# Patient Record
Sex: FEMALE | Race: WHITE | NOT HISPANIC OR LATINO | Employment: OTHER | URBAN - METROPOLITAN AREA
[De-identification: names, ages, dates, MRNs, and addresses within clinical notes are randomized per-mention and may not be internally consistent; named-entity substitution may affect disease eponyms.]

---

## 2017-11-01 ENCOUNTER — LAB VISIT (OUTPATIENT)
Dept: LAB | Facility: HOSPITAL | Age: 77
End: 2017-11-01
Attending: INTERNAL MEDICINE
Payer: MEDICARE

## 2017-11-01 DIAGNOSIS — D47.2 MONOCLONAL PARAPROTEINEMIA: ICD-10-CM

## 2017-11-01 LAB
ALBUMIN SERPL BCP-MCNC: 4.2 G/DL
ALP SERPL-CCNC: 71 U/L
ALT SERPL W/O P-5'-P-CCNC: 31 U/L
ANION GAP SERPL CALC-SCNC: 12 MMOL/L
AST SERPL-CCNC: 17 U/L
B2 MICROGLOB SERPL-MCNC: 4.6 UG/ML
BASOPHILS # BLD AUTO: 0.04 K/UL
BASOPHILS NFR BLD: 0.4 %
BILIRUB SERPL-MCNC: 0.3 MG/DL
BUN SERPL-MCNC: 30 MG/DL
CALCIUM SERPL-MCNC: 9.3 MG/DL
CHLORIDE SERPL-SCNC: 108 MMOL/L
CO2 SERPL-SCNC: 22 MMOL/L
CREAT SERPL-MCNC: 1.26 MG/DL
DIFFERENTIAL METHOD: ABNORMAL
EOSINOPHIL # BLD AUTO: 0.2 K/UL
EOSINOPHIL NFR BLD: 2.4 %
ERYTHROCYTE [DISTWIDTH] IN BLOOD BY AUTOMATED COUNT: 13.6 %
EST. GFR  (AFRICAN AMERICAN): 47 ML/MIN/1.73 M^2
EST. GFR  (NON AFRICAN AMERICAN): 41 ML/MIN/1.73 M^2
GLUCOSE SERPL-MCNC: 99 MG/DL
HCT VFR BLD AUTO: 34.2 %
HGB BLD-MCNC: 11.1 G/DL
IGA SERPL-MCNC: 195 MG/DL
IGG SERPL-MCNC: 1162 MG/DL
IGM SERPL-MCNC: 152 MG/DL
LYMPHOCYTES # BLD AUTO: 3.7 K/UL
LYMPHOCYTES NFR BLD: 40.6 %
MCH RBC QN AUTO: 30.9 PG
MCHC RBC AUTO-ENTMCNC: 32.5 G/DL
MCV RBC AUTO: 95 FL
MONOCYTES # BLD AUTO: 0.4 K/UL
MONOCYTES NFR BLD: 4.6 %
NEUTROPHILS # BLD AUTO: 4.8 K/UL
NEUTROPHILS NFR BLD: 52 %
NRBC BLD-RTO: 0 /100 WBC
PLATELET # BLD AUTO: 270 K/UL
PMV BLD AUTO: 10.2 FL
POTASSIUM SERPL-SCNC: 4.1 MMOL/L
PROT SERPL-MCNC: 7.4 G/DL
RBC # BLD AUTO: 3.59 M/UL
SODIUM SERPL-SCNC: 142 MMOL/L
WBC # BLD AUTO: 9.16 K/UL

## 2017-11-01 PROCEDURE — 80053 COMPREHEN METABOLIC PANEL: CPT | Mod: PN

## 2017-11-01 PROCEDURE — 86334 IMMUNOFIX E-PHORESIS SERUM: CPT | Mod: 26,,, | Performed by: PATHOLOGY

## 2017-11-01 PROCEDURE — 83520 IMMUNOASSAY QUANT NOS NONAB: CPT

## 2017-11-01 PROCEDURE — 36415 COLL VENOUS BLD VENIPUNCTURE: CPT | Mod: PN

## 2017-11-01 PROCEDURE — 80053 COMPREHEN METABOLIC PANEL: CPT

## 2017-11-01 PROCEDURE — 82232 ASSAY OF BETA-2 PROTEIN: CPT

## 2017-11-01 PROCEDURE — 82784 ASSAY IGA/IGD/IGG/IGM EACH: CPT | Mod: 59

## 2017-11-01 PROCEDURE — 84165 PROTEIN E-PHORESIS SERUM: CPT | Mod: 26,,, | Performed by: PATHOLOGY

## 2017-11-01 PROCEDURE — 85025 COMPLETE CBC W/AUTO DIFF WBC: CPT

## 2017-11-01 PROCEDURE — 84165 PROTEIN E-PHORESIS SERUM: CPT

## 2017-11-01 PROCEDURE — 85025 COMPLETE CBC W/AUTO DIFF WBC: CPT | Mod: PN

## 2017-11-01 PROCEDURE — 86334 IMMUNOFIX E-PHORESIS SERUM: CPT

## 2017-11-02 LAB
ALBUMIN SERPL ELPH-MCNC: 3.62 G/DL
ALPHA1 GLOB SERPL ELPH-MCNC: 0.33 G/DL
ALPHA2 GLOB SERPL ELPH-MCNC: 0.88 G/DL
B-GLOBULIN SERPL ELPH-MCNC: 0.8 G/DL
GAMMA GLOB SERPL ELPH-MCNC: 1.18 G/DL
KAPPA LC SER QL IA: 8.2 MG/DL
KAPPA LC/LAMBDA SER IA: 1.98
LAMBDA LC SER QL IA: 4.15 MG/DL
PATHOLOGIST INTERPRETATION SPE: NORMAL
PROT SERPL-MCNC: 6.8 G/DL

## 2017-11-03 LAB
INTERPRETATION SERPL IFE-IMP: NORMAL
PATHOLOGIST INTERPRETATION IFE: NORMAL

## 2017-11-08 ENCOUNTER — LAB VISIT (OUTPATIENT)
Dept: LAB | Facility: HOSPITAL | Age: 77
End: 2017-11-08
Attending: INTERNAL MEDICINE
Payer: MEDICARE

## 2017-11-08 DIAGNOSIS — D47.2 MONOCLONAL PARAPROTEINEMIA: Primary | ICD-10-CM

## 2017-11-08 LAB
PROT 24H UR-MRATE: 68 MG/SPEC
PROT UR-MCNC: 15 MG/DL
URINE COLLECTION DURATION: 24 HR
URINE VOLUME: 450 ML

## 2017-11-08 PROCEDURE — 84166 PROTEIN E-PHORESIS/URINE/CSF: CPT

## 2017-11-08 PROCEDURE — 86335 IMMUNFIX E-PHORSIS/URINE/CSF: CPT

## 2017-11-08 PROCEDURE — 84156 ASSAY OF PROTEIN URINE: CPT

## 2017-11-08 PROCEDURE — 86335 IMMUNFIX E-PHORSIS/URINE/CSF: CPT | Mod: 26,,, | Performed by: PATHOLOGY

## 2017-11-08 PROCEDURE — 84166 PROTEIN E-PHORESIS/URINE/CSF: CPT | Mod: 26,,, | Performed by: PATHOLOGY

## 2017-11-09 LAB
INTERPRETATION UR IFE-IMP: NORMAL
PATHOLOGIST INTERPRETATION UIFE: NORMAL
PATHOLOGIST INTERPRETATION UPE: NORMAL
PROT PATTERN UR ELPH-IMP: NORMAL

## 2018-03-16 PROBLEM — J30.1 SEASONAL ALLERGIC RHINITIS DUE TO POLLEN: Status: ACTIVE | Noted: 2018-03-16

## 2018-03-16 PROBLEM — N18.30 STAGE 3 CHRONIC KIDNEY DISEASE: Status: ACTIVE | Noted: 2018-03-16

## 2018-11-07 PROBLEM — N17.9 ARF (ACUTE RENAL FAILURE): Status: ACTIVE | Noted: 2018-11-07

## 2018-11-07 PROBLEM — R11.2 N&V (NAUSEA AND VOMITING): Status: ACTIVE | Noted: 2018-11-07

## 2018-11-13 PROBLEM — D64.9 NORMOCYTIC ANEMIA: Status: ACTIVE | Noted: 2018-11-13

## 2018-11-13 PROBLEM — N30.00 ACUTE CYSTITIS: Status: ACTIVE | Noted: 2018-11-13

## 2018-11-13 PROBLEM — N18.9 ACUTE KIDNEY INJURY SUPERIMPOSED ON CKD: Status: ACTIVE | Noted: 2018-11-13

## 2018-11-13 PROBLEM — N17.9 AKI (ACUTE KIDNEY INJURY): Status: ACTIVE | Noted: 2018-11-13

## 2018-11-13 PROBLEM — N17.9 ACUTE KIDNEY INJURY SUPERIMPOSED ON CKD: Status: ACTIVE | Noted: 2018-11-13

## 2018-11-15 PROBLEM — R63.8 INADEQUATE ORAL INTAKE: Status: ACTIVE | Noted: 2018-11-15

## 2018-11-16 PROBLEM — N17.9 AKI (ACUTE KIDNEY INJURY): Status: RESOLVED | Noted: 2018-11-13 | Resolved: 2018-11-16

## 2019-03-18 PROBLEM — R19.7 DIARRHEA: Status: ACTIVE | Noted: 2019-03-18

## 2019-03-18 PROBLEM — N18.4 BENIGN HYPERTENSION WITH CKD (CHRONIC KIDNEY DISEASE) STAGE IV: Status: ACTIVE | Noted: 2019-03-18

## 2019-03-18 PROBLEM — I12.9 BENIGN HYPERTENSION WITH CKD (CHRONIC KIDNEY DISEASE) STAGE IV: Status: ACTIVE | Noted: 2019-03-18

## 2019-04-15 ENCOUNTER — LAB VISIT (OUTPATIENT)
Dept: LAB | Facility: HOSPITAL | Age: 79
End: 2019-04-15
Attending: INTERNAL MEDICINE
Payer: MEDICARE

## 2019-04-15 DIAGNOSIS — N18.30 STAGE 3 CHRONIC KIDNEY DISEASE: ICD-10-CM

## 2019-04-15 DIAGNOSIS — N18.2 STAGE 2 CHRONIC KIDNEY DISEASE: ICD-10-CM

## 2019-04-15 DIAGNOSIS — D64.9 NORMOCYTIC ANEMIA: ICD-10-CM

## 2019-04-15 DIAGNOSIS — D47.2 MONOCLONAL PARAPROTEINEMIA: ICD-10-CM

## 2019-04-15 LAB
ABO + RH BLD: NORMAL
BLD GP AB SCN CELLS X3 SERPL QL: NORMAL

## 2019-04-15 PROCEDURE — 86850 RBC ANTIBODY SCREEN: CPT

## 2019-04-15 PROCEDURE — 86850 RBC ANTIBODY SCREEN: CPT | Mod: PN

## 2019-04-15 PROCEDURE — 36415 COLL VENOUS BLD VENIPUNCTURE: CPT | Mod: PN

## 2019-05-03 PROBLEM — D72.820 MONOCLONAL B-CELL LYMPHOCYTOSIS OF UNDETERMINED SIGNIFICANCE: Status: ACTIVE | Noted: 2019-05-03

## 2019-05-06 PROBLEM — I63.9 CEREBROVASCULAR ACCIDENT (CVA): Status: ACTIVE | Noted: 2019-05-06

## 2019-05-06 PROBLEM — R47.01 APHASIA: Status: ACTIVE | Noted: 2019-05-06

## 2019-07-24 ENCOUNTER — TELEPHONE (OUTPATIENT)
Dept: NEUROLOGY | Facility: CLINIC | Age: 79
End: 2019-07-24

## 2019-07-24 ENCOUNTER — OFFICE VISIT (OUTPATIENT)
Dept: NEUROLOGY | Facility: CLINIC | Age: 79
End: 2019-07-24
Payer: MEDICARE

## 2019-07-24 VITALS
HEART RATE: 92 BPM | WEIGHT: 133.19 LBS | BODY MASS INDEX: 20.18 KG/M2 | RESPIRATION RATE: 16 BRPM | DIASTOLIC BLOOD PRESSURE: 60 MMHG | HEIGHT: 68 IN | SYSTOLIC BLOOD PRESSURE: 91 MMHG

## 2019-07-24 DIAGNOSIS — I49.8 OTHER SPECIFIED CARDIAC ARRHYTHMIAS: ICD-10-CM

## 2019-07-24 DIAGNOSIS — Z86.73 HISTORY OF EMBOLIC STROKE: ICD-10-CM

## 2019-07-24 DIAGNOSIS — N18.30 STAGE 3 CHRONIC KIDNEY DISEASE: ICD-10-CM

## 2019-07-24 DIAGNOSIS — R47.01 APHASIA: ICD-10-CM

## 2019-07-24 DIAGNOSIS — Z86.73 HISTORY OF STROKE: ICD-10-CM

## 2019-07-24 DIAGNOSIS — I10 ESSENTIAL HYPERTENSION: ICD-10-CM

## 2019-07-24 DIAGNOSIS — I63.333 CEREBRAL INFARCTION DUE TO BILATERAL THROMBOSIS OF POSTERIOR CEREBRAL ARTERIES: Primary | ICD-10-CM

## 2019-07-24 DIAGNOSIS — D47.2 MONOCLONAL PARAPROTEINEMIA: ICD-10-CM

## 2019-07-24 DIAGNOSIS — D72.820 MONOCLONAL B-CELL LYMPHOCYTOSIS OF UNDETERMINED SIGNIFICANCE: ICD-10-CM

## 2019-07-24 DIAGNOSIS — E78.5 DYSLIPIDEMIA, GOAL LDL BELOW 70: ICD-10-CM

## 2019-07-24 PROCEDURE — 99214 OFFICE O/P EST MOD 30 MIN: CPT | Mod: PBBFAC,PN | Performed by: PSYCHIATRY & NEUROLOGY

## 2019-07-24 PROCEDURE — 99205 PR OFFICE/OUTPT VISIT, NEW, LEVL V, 60-74 MIN: ICD-10-PCS | Mod: S$PBB,,, | Performed by: PSYCHIATRY & NEUROLOGY

## 2019-07-24 PROCEDURE — 99205 OFFICE O/P NEW HI 60 MIN: CPT | Mod: S$PBB,,, | Performed by: PSYCHIATRY & NEUROLOGY

## 2019-07-24 PROCEDURE — 99999 PR PBB SHADOW E&M-EST. PATIENT-LVL IV: ICD-10-PCS | Mod: PBBFAC,,, | Performed by: PSYCHIATRY & NEUROLOGY

## 2019-07-24 PROCEDURE — 99999 PR PBB SHADOW E&M-EST. PATIENT-LVL IV: CPT | Mod: PBBFAC,,, | Performed by: PSYCHIATRY & NEUROLOGY

## 2019-07-24 RX ORDER — CYPROHEPTADINE HYDROCHLORIDE 2 MG/5ML
2 SOLUTION ORAL 2 TIMES DAILY
Refills: 12 | COMMUNITY
Start: 2019-07-12 | End: 2020-01-27 | Stop reason: SDUPTHER

## 2019-07-24 RX ORDER — CLOPIDOGREL BISULFATE 75 MG/1
75 TABLET ORAL DAILY
Qty: 90 TABLET | Refills: 3 | Status: SHIPPED | OUTPATIENT
Start: 2019-07-24 | End: 2019-07-24

## 2019-07-24 RX ORDER — CLOPIDOGREL BISULFATE 75 MG/1
75 TABLET ORAL DAILY
Qty: 90 TABLET | Refills: 3 | Status: SHIPPED | OUTPATIENT
Start: 2019-07-24 | End: 2020-01-01 | Stop reason: SDUPTHER

## 2019-07-24 NOTE — LETTER
July 30, 2019      Ernesto Baeza MD  [address]  [facility] [address]           [facility] Neurology  [address] [facility] [address] [place] [address]  Phone: [phone]  Fax: [phone]          Patient: Natali Chamberlain   MR Number: [id number]   YOB: 1940   Date of Visit: 7/24/2019       Dear Dr. Ernesto Baeza:    Thank you for referring Natali Chamberlain to me for evaluation. Attached you will find relevant portions of my assessment and plan of care.    If you have questions, please do not hesitate to call me. I look forward to following Natali Chamberlain along with you.    Sincerely,    Jas Begum, DO    Enclosure  CC:  No Recipients    If you would like to receive this communication electronically, please contact [email or web address] or [phone] to request more information on [employer] Link access.    For providers and/or their staff who would like to refer a patient to [facility], please contact us through our one-stop-shop provider referral line, [facility], at [phone].    If you feel you have received this communication in error or would no longer like to receive these types of communications, please e-mail [email or web address]

## 2019-07-24 NOTE — PROGRESS NOTES
Subjective:      7/24/2019       Patient ID: Natali Chamberlain is a right handed 79 y.o.  female who presents for evaluation of recent stroke    History of Present Illness 7/24/2019    Late April, developed word finding difficulty, trouble forming words. Daughter said could not get any words out.  Recalls knowing what she wanted to say. No hemiparesis. Resolved.     Hx of stroke in 2009; clinically presented with inability to talk, went to hospital (was in [place], [place]), worsened to involve BOTH sides of body symmetrically, could not walk or feed herself, went to rehab and recovered over several months, still would drag R leg, speech returned to normal. They do not have any idea what the cause of her stroke was.     Hx Dm - not sure if this was real? Not sure if HTN.  Former smoker.     Do not have any imaging studies; reviewed Radiology report which described severe atrophy, b/l regions of encephalomalacia, and two small acute lesions c/w stroke R and L occipital lobes.  Suggestive of embolic event from the posterior circulation.    Review of patient's allergies indicates:   Allergen Reactions    Penicillins      Other reaction(s): fainted     Current Outpatient Medications   Medication Sig Dispense Refill    atorvastatin (LIPITOR) 10 MG tablet TAKE ONE TABLET BY MOUTH ONCE DAILY 90 tablet 3    calcitRIOL (ROCALTROL) 0.25 MCG Cap Take 0.25 mcg by mouth. Take one tablet every Monday and Friday      calcium carbonate/vitamin D3 (VITAMIN D-3 ORAL) Take 25 mcg by mouth once daily.      clopidogrel (PLAVIX) 75 mg tablet Take 1 tablet (75 mg total) by mouth once daily. 90 tablet 3    cyanocobalamin 2000 MCG tablet Take 2,500 mcg by mouth once daily.      cyproheptadine (,PERIACTIN,) 2 mg/5 mL syrup Take 2 mg by mouth 2 (two) times daily.  12    diphenoxylate-atropine 2.5-0.025 mg (LOMOTIL) 2.5-0.025 mg per tablet Take 1 tablet by mouth 4 (four) times daily as needed for Diarrhea. 30 tablet 1    ferrous  sulfate, dried (SLOW FE) 160 mg (50 mg iron) TbSR Take 160 mg by mouth 2 (two) times daily.       L. rhamnosus GG/inulin (CULTURELLE PROBIOTICS ORAL) Take by mouth.      loratadine (CLARITIN) 10 mg tablet Take 10 mg by mouth once daily.      magnesium oxide 400 mg Cap Take 1 tablet by mouth once daily.      megestrol (MEGACE) 400 mg/10 mL (40 mg/mL) Susp Take by mouth. Take 10 ml by mouth daily      metoprolol succinate 25 mg CSpX Take 1 capsule by mouth once daily.      sertraline (ZOLOFT) 50 MG tablet TAKE ONE TABLET BY MOUTH ONCE DAILY 90 tablet 3    sodium bicarbonate 325 MG tablet Take 650 mg by mouth 3 (three) times daily.        No current facility-administered medications for this visit.        Past Medical History  Past Medical History:   Diagnosis Date    Abnormal blood chemistry     Allergic rhinitis     Ankle fracture 10/25/2016    right ankle    ARF (acute renal failure)     B12 deficiency     Depression     Dyslipidemia     Encounter for blood transfusion     Family history of stroke (cerebrovascular)     Fatigue     History of chicken pox     History of CVA (cerebrovascular accident)        Past Surgical History  Past Surgical History:   Procedure Laterality Date    Biopsy-bone marrow N/A 4/24/2019    Performed by [facility]win Sanchez [facility] at [facility] CATH    BIOPSY-BONE MARROW N/A 12/5/2017    Performed by Eladio Scherer [facility] at [facility] CATH    CYST REMOVAL      ovarian    PARTIAL HYSTERECTOMY         Family History  Family History   Problem Relation Age of Onset    Stroke Mother        Social History  Social History     Socioeconomic History    Marital status:      Spouse name: Not on file    Number of children: 2    Years of education: Not on file    Highest education level: Not on file   Occupational History    Not on file   Social Needs    Financial resource strain: Not on file    Food insecurity:     Worry: Not on file     Inability: Not on file    Transportation needs:      Medical: Not on file     Non-medical: Not on file   Tobacco Use    Smoking status: Former Smoker     Packs/day: 0.00     Last attempt to quit: 2018     Years since quittin.4    Smokeless tobacco: Never Used   Substance and Sexual Activity    Alcohol use: No    Drug use: No    Sexual activity: Not on file   Lifestyle    Physical activity:     Days per week: Not on file     Minutes per session: Not on file    Stress: Not on file   Relationships    Social connections:     Talks on phone: Not on file     Gets together: Not on file     Attends [employer] service: Not on file     Active member of club or organization: Not on file     Attends meetings of clubs or organizations: Not on file     Relationship status: Not on file   Other Topics Concern    Not on file   Social History Narrative    Not on file        Review of Systems  Review of Systems   Constitutional: Positive for fatigue and unexpected weight change (loss). Negative for chills and fever.   HENT: Negative for congestion.    Eyes: Negative for visual disturbance.   Respiratory: Positive for cough. Negative for shortness of breath and wheezing.    Cardiovascular: Negative for chest pain and palpitations.   Gastrointestinal: Negative for abdominal pain, constipation, diarrhea, nausea and vomiting.   Endocrine: Negative for cold intolerance and heat intolerance.   Genitourinary: Positive for difficulty urinating (incontinence). Negative for dysuria and hematuria.   Musculoskeletal: Positive for gait problem. Negative for arthralgias and myalgias.   Skin: Negative for rash and wound.   Allergic/Immunologic: Negative for immunocompromised state.   Neurological: Positive for speech difficulty and weakness. Negative for dizziness, tremors, seizures, numbness and headaches.   Hematological: Does not bruise/bleed easily.   Psychiatric/Behavioral: Negative for dysphoric mood and sleep disturbance. The patient is not nervous/anxious.        Objective:         Vitals:    07/24/19 1446   BP: 91/60   Pulse: 92   Resp: 16     Body mass index is 20.25 kg/m².  Neurologic Exam     Mental Status   Oriented to person.   Oriented to place.   Registration: recalls 2 of 3 objects (3/3 on second trial). Recall at 5 minutes: recalls 3 of 3 objects.   Attention: decreased. Concentration: normal.   Speech: speech is normal   Level of consciousness: alert  Able to name object. Able to repeat. Normal comprehension.   July, 21st, 1991  President Candis     Cranial Nerves   Cranial nerves II through XII intact.     Motor Exam   Muscle bulk: normal  Overall muscle tone: normal  Right arm pronator drift: absent  Left arm pronator drift: absent    Strength   Strength 5/5 throughout.     Sensory Exam   Light touch normal.   Pinprick normal.       Physical Exam   Neurological: She has normal strength.   Psychiatric: Her speech is normal.         Data Review:     Results for MARIANN CAMPBELL (MRN [id number]) as of 7/24/2019 15:47   Ref. Range 7/6/2019 08:41   WBC Latest Ref Range: 3.90 - 12.70 K/uL 9.12   RBC Latest Ref Range: 4.00 - 5.40 M/uL 2.61 (L)   Hemoglobin Latest Ref Range: 12.0 - 16.0 g/dL 8.4 (L)   Hematocrit Latest Ref Range: 37.0 - 48.5 % 25.7 (L)   MCV Latest Ref Range: 82 - 98 fL 99 (H)   MCH Latest Ref Range: 27.0 - 31.0 pg 32.2 (H)   MCHC Latest Ref Range: 32.0 - 36.0 g/dL 32.7   RDW Latest Ref Range: 11.5 - 14.5 % 13.5   Platelets Latest Ref Range: 150 - 350 K/uL 350   MPV Latest Ref Range: 9.2 - 12.9 fL 10.3   Gran% Latest Ref Range: 38.0 - 73.0 % 54.4   Gran # (ANC) Latest Ref Range: 1.8 - 7.7 K/uL 5.0   Lymph% Latest Ref Range: 18.0 - 48.0 % 30.6   Lymph # Latest Ref Range: 1.0 - 4.8 K/uL 2.8   Mono% Latest Ref Range: 4.0 - 15.0 % 11.2   Mono # Latest Ref Range: 0.3 - 1.0 K/uL 1.0   Eosinophil% Latest Ref Range: 0.0 - 8.0 % 0.1   Eos # Latest Ref Range: 0.0 - 0.5 K/uL 0.0   Basophil% Latest Ref Range: 0.0 - 1.9 % 0.5   Baso # Latest Ref Range: 0.00 - 0.20 K/uL 0.05   nRBC  Latest Ref Range: 0 /100 WBC 0   Differential Method Unknown Automated   Immature Grans (Abs) Latest Ref Range: 0.00 - 0.04 K/uL 0.29 (H)   Immature Granulocytes Latest Ref Range: 0.0 - 0.5 % 3.2 (H)   Iron Latest Ref Range: 30 - 160 ug/dL 58   TIBC Latest Ref Range: 265 - 497 ug/dL 234 (L)   Ferritin Latest Ref Range: 12 - 264 ng/mL 714 (H)   Iron Saturation Latest Ref Range: 20 - 50 % 25   Sodium Latest Ref Range: 136 - 145 mmol/L 136   Potassium Latest Ref Range: 3.5 - 5.1 mmol/L 3.9   Chloride Latest Ref Range: 95 - 110 mmol/L 98   CO2 Latest Ref Range: 22 - 31 mmol/L 23   Anion Gap Latest Ref Range: 8 - 16 mmol/L 15   BUN, Bld Latest Ref Range: 7 - 18 mg/dL 43 (H)   Creatinine Latest Ref Range: 0.50 - 1.40 mg/dL 3.82 (H)   eGFR if non African American Latest Ref Range: >60 mL/min/1.73 m^2 10 (A)   eGFR if African American Latest Ref Range: >60 mL/min/1.73 m^2 12 (A)   Glucose Latest Ref Range: 70 - 110 mg/dL 162 (H)   Calcium Latest Ref Range: 8.4 - 10.2 mg/dL 9.0   Phosphorus Latest Ref Range: 2.7 - 4.5 mg/dL 4.1   Magnesium Latest Ref Range: 1.6 - 2.6 mg/dL 2.1   Alkaline Phosphatase Latest Ref Range: 38 - 145 U/L 73   PROTEIN TOTAL Latest Ref Range: 6.0 - 8.4 g/dL 6.7   Albumin Latest Ref Range: 3.5 - 5.2 g/dL 3.3 (L)   Uric Acid Latest Ref Range: 2.4 - 5.7 mg/dL 6.2 (H)   BILIRUBIN TOTAL Latest Ref Range: 0.2 - 1.3 mg/dL 0.6   AST Latest Ref Range: 14 - 36 U/L 33   ALT Latest Ref Range: 10 - 44 U/L 29   CRP Latest Ref Range: 0.00 - 0.90 mg/dL 14.50 (H)   Triglycerides Latest Ref Range: 30 - 150 mg/dL 71   Cholesterol Latest Ref Range: 120 - 199 mg/dL 105 (L)   HDL Latest Ref Range: 40 - 75 mg/dL 31 (L)   Hdl/Cholesterol Ratio Latest Ref Range: 20.0 - 50.0 % 29.5   LDL Cholesterol External Latest Ref Range: 63.0 - 159.0 mg/dL 59.8 (L)   Non-HDL Cholesterol Latest Units: mg/dL 74   Total Cholesterol/HDL Ratio Latest Ref Range: 2.0 - 5.0  3.4   ALDOSTERONE Latest Units: ng/dL 4.5   Hemoglobin A1C External  Latest Ref Range: 0.0 - 5.6 % 5.5   Estimated Avg Glucose Latest Ref Range: 68 - 131 mg/dL 111   PTH Latest Ref Range: 9.0 - 77.0 pg/mL 126.0 (H)     Results for MARIANN CAMPBELL (MRN [id number]) as of 7/24/2019 15:47   Ref. Range 7/10/2019 07:30 7/10/2019 07:30   Specimen UA Unknown Urine, Unspecified    Color, UA Latest Ref Range: Yellow, Straw, Emily  Yellow    Appearance, UA Latest Ref Range: Clear  Turbid    Specific Gravity, UA Latest Ref Range: 1.005 - 1.030  <=1.005    pH, UA Latest Ref Range: 5.0 - 8.0  8.0    Protein, UA Latest Ref Range: Negative  2+ (A)    Glucose, UA Latest Ref Range: Negative  Negative    Ketones, UA Latest Ref Range: Negative  Negative    Occult Blood UA Latest Ref Range: Negative  2+ (A)    NITRITE UA Latest Ref Range: Negative  Positive (A)    UROBILINOGEN UA Latest Ref Range: <2.0 EU/dL 0.2    Bilirubin (UA) Latest Ref Range: Negative  Negative    Leukocytes, UA Latest Ref Range: Negative  3+ (A)    RBC, UA Latest Ref Range: 0 - 4 /hpf  0   WBC, UA Latest Ref Range: 0 - 5 /hpf >100 (H) >100 (H)   Bacteria, UA Latest Ref Range: None-Occ /hpf  SEE COMMENT   Squam Epithel, UA Latest Units: /hpf  SEE COMMENT   Hyaline Casts, UA Latest Ref Range: 0 - 1 /lpf  0   Microscopic Comment Unknown  SEE COMMENT     Carotid U/S 5/9/19:  Impression       There are atherosclerotic changes demonstrated bilaterally left slightly more so than right.  There is anterograde vertebral color Doppler flow demonstrated bilaterally.  No Doppler hemodynamically significant carotid stenosis is appreciated.    NASCET criteria were utilized.     Echo 3/19/19:  TRANSTHORACIC ECHO (TTE) COMPLETE   Conclusion     · Normal left ventricular systolic function. The estimated ejection fraction is 60%  · Grade I (mild) left ventricular diastolic dysfunction consistent with impaired relaxation.  · Normal left atrial pressure.  · Normal right ventricular systolic function.     72 hour Holter 2/19/19:  HOLTER MONITOR - 72 HOUR    Conclusion     · Unremarkable event monitor there is no evidence of clinically significant tachy or bradyarrhythmia detected  · Patient reported 3 events there is no correlation with arrhythmia       Assessment:       1. 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 History of embolic stroke    3. History of stroke    4. Monoclonal paraproteinemia    5. Monoclonal B-cell lymphocytosis of undetermined significance    6. Essential hypertension    7. Stage 3 chronic kidney disease    8. Dyslipidemia, goal LDL below 70    9. Other specified cardiac arrhythmias     10. 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  Discussed tx strategies in detail         Relevant Medications    clopidogrel (PLAVIX) 75 mg tablet    Other Relevant Orders    MRA Brain    MRA Neck without contrast    Cardiac event monitor       Cardiac/Vascular    Essential hypertension    Dyslipidemia, goal LDL below 70       Renal/    Stage 3 chronic kidney disease       Oncology    Monoclonal paraproteinemia    Monoclonal B-cell lymphocytosis of undetermined significance      Other Visit Diagnoses     Other specified cardiac arrhythmias         Relevant Orders    Cardiac event monitor          Follow up in about 6 weeks (around 9/4/2019).        Patient information shared at the time of visit:  To reduce the risk of stroke or heart disease, a healthy diet and regular exercise are extremely important  in addition to medical treatment.    A Mediterranean style diet, or the DASH diet with lots of fresh fruits and vegetables, whole grains, more fish and chicken, less red meat, less dairy, less processed foods is beneficial.      Recommend taking omega-3 fatty acids with sufficient amounts of EPA and DHA, 1000 to 2000 mg a day    Walk 30 minutes a day, 3-5 days a week - if joint pain is an issue, consider stationary bicycle, swimming, or other low impact exercises.  Relaxation and stress reduction through exercise, yoga, mindfulness meditation, or formal counseling is also beneficial overall towards health.    Control of high blood pressure, elevated cholesterol (specifically LDL target below 70), blood sugar and diabetes control, not smoking, avoiding excessive alcohol consumption (less than one alcoholic drink a day for women, two a day for men) and regular follow up with your doctor can significantly reduce the risk of future stroke.     Over 60 minutes time spent with patient face to face, > 50% in discussion and counseling with patient regarding diagnosis, prognosis and treatment strategies related to embolic stroke as described above    Thank you very much for the opportunity to assist in this patient's care.  If you have any questions or concerns, please do not hesitate to contact me at any time.     Sincerely,  Jas Begum, DO

## 2019-07-30 PROBLEM — I63.333: Status: ACTIVE | Noted: 2019-07-30

## 2019-07-30 NOTE — ASSESSMENT & PLAN NOTE
Suspect embolic event - plavix for now, refer for ILR and if demonstrates AF switch to oral AC, likely will need warfarin because of renal status.  Discussed tx strategies in detail

## 2019-07-31 ENCOUNTER — TELEPHONE (OUTPATIENT)
Dept: NEUROLOGY | Facility: CLINIC | Age: 79
End: 2019-07-31

## 2019-07-31 NOTE — TELEPHONE ENCOUNTER
----- Message from Theodora Rasmussen sent at 7/31/2019 10:12 AM CDT -----  Contact: Daughter  Type: Needs Medical Advice    Who Called: Salena La Call Back Number: [phone]  Additional Information: Need an order for speech therapy sent to [facility] today [phone]. If not the patinet will lose her other services the therapy must be done thru [facility] since they do offer speech the daughter would like a call back If there is any questions

## 2019-08-20 ENCOUNTER — HOSPITAL ENCOUNTER (OUTPATIENT)
Dept: RADIOLOGY | Facility: HOSPITAL | Age: 79
Discharge: HOME | End: 2019-08-20
Attending: PSYCHIATRY & NEUROLOGY
Payer: MEDICARE

## 2019-08-20 DIAGNOSIS — I63.333 CEREBRAL INFARCTION DUE TO BILATERAL THROMBOSIS OF POSTERIOR CEREBRAL ARTERIES: ICD-10-CM

## 2019-08-20 DIAGNOSIS — Z86.73 HISTORY OF STROKE: ICD-10-CM

## 2019-08-20 PROCEDURE — 70547 MR ANGIOGRAPHY NECK W/O DYE: CPT | Mod: 26,,, | Performed by: RADIOLOGY

## 2019-08-20 PROCEDURE — 70544 MR ANGIOGRAPHY HEAD W/O DYE: CPT | Mod: TC,PO

## 2019-08-20 PROCEDURE — 70544 MRA BRAIN WITHOUT CONTRAST: ICD-10-PCS | Mod: 26,,, | Performed by: RADIOLOGY

## 2019-08-20 PROCEDURE — 70547 MR ANGIOGRAPHY NECK W/O DYE: CPT | Mod: TC,PO

## 2019-08-20 PROCEDURE — 70544 MR ANGIOGRAPHY HEAD W/O DYE: CPT | Mod: 26,,, | Performed by: RADIOLOGY

## 2019-08-20 PROCEDURE — 70547 MRA NECK WITHOUT CONTRAST: ICD-10-PCS | Mod: 26,,, | Performed by: RADIOLOGY

## 2019-08-29 ENCOUNTER — OFFICE VISIT (OUTPATIENT)
Dept: NEUROLOGY | Facility: CLINIC | Age: 79
End: 2019-08-29
Payer: MEDICARE

## 2019-08-29 ENCOUNTER — TELEPHONE (OUTPATIENT)
Dept: HOME HEALTH SERVICES | Facility: HOSPITAL | Age: 79
End: 2019-08-29

## 2019-08-29 VITALS
DIASTOLIC BLOOD PRESSURE: 72 MMHG | RESPIRATION RATE: 18 BRPM | SYSTOLIC BLOOD PRESSURE: 122 MMHG | HEART RATE: 89 BPM | WEIGHT: 129.88 LBS | HEIGHT: 68 IN | BODY MASS INDEX: 19.68 KG/M2

## 2019-08-29 DIAGNOSIS — N18.4 STAGE 4 CHRONIC KIDNEY DISEASE: ICD-10-CM

## 2019-08-29 DIAGNOSIS — I10 ESSENTIAL HYPERTENSION: ICD-10-CM

## 2019-08-29 DIAGNOSIS — R47.01 APHASIA: ICD-10-CM

## 2019-08-29 DIAGNOSIS — Z86.73 CHRONIC ARTERIAL ISCHEMIC STROKE, MULTIFOCAL, MULTIPLE VASCULAR TERRITORIES: Primary | ICD-10-CM

## 2019-08-29 DIAGNOSIS — I65.22 LEFT CAROTID ARTERY STENOSIS: ICD-10-CM

## 2019-08-29 DIAGNOSIS — E78.5 DYSLIPIDEMIA, GOAL LDL BELOW 70: ICD-10-CM

## 2019-08-29 DIAGNOSIS — I67.2 ATHEROSCLEROTIC CEREBROVASCULAR DISEASE: ICD-10-CM

## 2019-08-29 PROCEDURE — 99214 OFFICE O/P EST MOD 30 MIN: CPT | Mod: S$PBB,,, | Performed by: PSYCHIATRY & NEUROLOGY

## 2019-08-29 PROCEDURE — 99999 PR PBB SHADOW E&M-EST. PATIENT-LVL III: CPT | Mod: PBBFAC,,, | Performed by: PSYCHIATRY & NEUROLOGY

## 2019-08-29 PROCEDURE — 99213 OFFICE O/P EST LOW 20 MIN: CPT | Mod: PBBFAC,PN | Performed by: PSYCHIATRY & NEUROLOGY

## 2019-08-29 PROCEDURE — 99214 PR OFFICE/OUTPT VISIT, EST, LEVL IV, 30-39 MIN: ICD-10-PCS | Mod: S$PBB,,, | Performed by: PSYCHIATRY & NEUROLOGY

## 2019-08-29 PROCEDURE — 99999 PR PBB SHADOW E&M-EST. PATIENT-LVL III: ICD-10-PCS | Mod: PBBFAC,,, | Performed by: PSYCHIATRY & NEUROLOGY

## 2019-08-29 NOTE — PROGRESS NOTES
Subjective:          Patient ID: Natali Chamberlain is a 79 y.o.  female who presents for follow up of recent stroke     History of Present Illness 7/24/2019     Late April, developed word finding difficulty, trouble forming words. Daughter said could not get any words out.  Recalls knowing what she wanted to say. No hemiparesis. Resolved.      Hx of stroke in 2009; clinically presented with inability to talk, went to hospital (was in [place], [place]), worsened to involve BOTH sides of body symmetrically, could not walk or feed herself, went to rehab and recovered over several months, still would drag R leg, speech returned to normal. They do not have any idea what the cause of her stroke was.      Hx Dm - not sure if this was real? Not sure if HTN.  Former smoker.      Do not have any imaging studies; reviewed Radiology report which described severe atrophy, b/l regions of encephalomalacia, and two small acute lesions c/w stroke R and L occipital lobes.  Suggestive of embolic event from the posterior circulation.    Interval history 8/31/2019:    Reviewed history with her family; onset of aphasia in late April, sudden onset  Review of patient's allergies indicates:   Allergen Reactions    Penicillins      Other reaction(s): fainted     Current Outpatient Medications   Medication Sig Dispense Refill    atorvastatin (LIPITOR) 10 MG tablet TAKE ONE TABLET BY MOUTH ONCE DAILY 90 tablet 3    calcitRIOL (ROCALTROL) 0.25 MCG Cap Take 0.25 mcg by mouth every Monday. Take one tablet every Monday      calcium carbonate/vitamin D3 (VITAMIN D-3 ORAL) Take 25 mcg by mouth once daily.      clopidogrel (PLAVIX) 75 mg tablet Take 1 tablet (75 mg total) by mouth once daily. 90 tablet 3    cyanocobalamin 2000 MCG tablet Take 2,500 mcg by mouth once daily.      cyproheptadine (,PERIACTIN,) 2 mg/5 mL syrup Take 2 mg by mouth 2 (two) times daily.  12    diphenoxylate-atropine 2.5-0.025 mg (LOMOTIL) 2.5-0.025 mg per tablet  Take 1 tablet by mouth 4 (four) times daily as needed for Diarrhea. 30 tablet 1    ferrous sulfate, dried (SLOW FE) 160 mg (50 mg iron) TbSR Take 160 mg by mouth 2 (two) times daily.       L. rhamnosus GG/inulin (CULTURELLE PROBIOTICS ORAL) Take by mouth.      loratadine (CLARITIN) 10 mg tablet Take 10 mg by mouth once daily.      magnesium oxide 400 mg Cap Take 1 tablet by mouth once daily.      metoprolol succinate 25 mg CSpX Take 1 capsule by mouth once daily.      sertraline (ZOLOFT) 50 MG tablet TAKE ONE TABLET BY MOUTH ONCE DAILY 90 tablet 3    sodium bicarbonate 325 MG tablet Take 650 mg by mouth 3 (three) times daily.        No current facility-administered medications for this visit.          Review of Systems  Review of Systems    Objective:        Vitals:    08/29/19 1547   BP: 122/72   Pulse: 89   Resp: 18     Body mass index is 19.74 kg/m².  Neurologic Exam     Mental Status   Awake, alert, conversant.  In my office, [place].  After a pause August, 26th, 1991. Summer.     Reg2/3, 3/3 second trial    WORLD-DLOW    Recall 5 minutes later 3/3     Cranial Nerves   Cranial nerves II through XII intact.     Motor Exam   Muscle bulk: normal  Overall muscle tone: normal  Right arm pronator drift: absent  Left arm pronator drift: absent    Strength   Strength 5/5 throughout.       Physical Exam   Neurological: She has normal strength.         Data Review:    MRI brain 5/2019: personally reviewed and interpreted images      Region mentioned in report - one very small (approx 2 mm) region of increased diffuseion, no corresponding dark signal on ADC.  Possible subacute region of infarction vs T2 shine through. In context of the profoundly severe diffuse chronic small vessel disease, likely an active component of the overall chronic process        MRA neck 8/20/19:  Impression       1. There is a moderate short segment stenosis at the origin of the left internal carotid artery with stenosis close to 70%.   There is however patient motion which slightly limits evaluation and could be further evaluated with CTA.  2. 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     MRA head 8/20/19:  Impression       1. There are extensive chronic changes in the brain with diffuse volume loss and extensive white matter change with scattered chronic infarctions in the cerebral hemispheres as well as in bilateral cerebellar hemispheres.  There is no hemorrhage.  There is no mass.  There is no hydrocephalus or midline shift.  2. Anterior circulation is grossly normal.  3. With regard to posterior circulation, the left vertebral artery is not clearly visualized.  Very minimal flow in the very distal left vertebral artery could represent retrograde flow from the dominant and patent right vertebral artery.  The left vertebral artery may be hypoplastic and likely terminates in a posterior inferior cerebellar artery.  4. There is fetal origin of the left posterior cerebral artery which arises from the left anterior circulation.  This vessel is widely patent.  5. The right posterior cerebral artery is normal.  Please see above discussion.       Carotid U/S 5/9/19:  Impression         There are atherosclerotic changes demonstrated bilaterally left slightly more so than right.  There is anterograde vertebral color Doppler flow demonstrated bilaterally.  No Doppler hemodynamically significant carotid stenosis is appreciated.    NASCET criteria were utilized.      Echo 3/19/19:  TRANSTHORACIC ECHO (TTE) COMPLETE   Conclusion      · Normal left ventricular systolic function. The estimated ejection fraction is 60%  · Grade I (mild) left ventricular diastolic dysfunction consistent with impaired relaxation.  · Normal left atrial pressure.  · Normal right ventricular systolic function.      72 hour Holter 2/19/19:  HOLTER MONITOR - 72 HOUR   Conclusion      · Unremarkable event  monitor there is no evidence of clinically significant tachy or bradyarrhythmia detected  · Patient reported 3 events there is no correlation with arrhythmia     Results for MARIANN CAMPBELL (MRN [id number]) as of 8/29/2019 15:44   Ref. Range 8/19/2019 09:21   WBC Latest Ref Range: 3.90 - 12.70 K/uL 8.34   RBC Latest Ref Range: 4.00 - 5.40 M/uL 3.30 (L)   Hemoglobin Latest Ref Range: 12.0 - 16.0 g/dL 10.8 (L)   Hematocrit Latest Ref Range: 37.0 - 48.5 % 33.6 (L)   MCV Latest Ref Range: 82 - 98 fL 102 (H)   MCH Latest Ref Range: 27.0 - 31.0 pg 32.7 (H)   MCHC Latest Ref Range: 32.0 - 36.0 g/dL 32.1   RDW Latest Ref Range: 11.5 - 14.5 % 13.8   Platelets Latest Ref Range: 150 - 350 K/uL 265   MPV Latest Ref Range: 9.2 - 12.9 fL 10.0   Gran% Latest Ref Range: 38.0 - 73.0 % 67.6   Gran # (ANC) Latest Ref Range: 1.8 - 7.7 K/uL 5.6   Lymph% Latest Ref Range: 18.0 - 48.0 % 22.4   Lymph # Latest Ref Range: 1.0 - 4.8 K/uL 1.9   Mono% Latest Ref Range: 4.0 - 15.0 % 6.4   Mono # Latest Ref Range: 0.3 - 1.0 K/uL 0.5   Eosinophil% Latest Ref Range: 0.0 - 8.0 % 3.0   Eos # Latest Ref Range: 0.0 - 0.5 K/uL 0.3   Basophil% Latest Ref Range: 0.0 - 1.9 % 0.4   Baso # Latest Ref Range: 0.00 - 0.20 K/uL 0.03   nRBC Latest Ref Range: 0 /100 WBC 0   Differential Method Unknown Automated   Immature Grans (Abs) Latest Ref Range: 0.00 - 0.04 K/uL 0.02   Immature Granulocytes Latest Ref Range: 0.0 - 0.5 % 0.2   Iron Latest Ref Range: 30 - 160 ug/dL 271 (H)   TIBC Latest Ref Range: 265 - 497 ug/dL 240 (L)   Ferritin Latest Ref Range: 12 - 264 ng/mL 596 (H)   Iron Saturation Latest Ref Range: 20 - 50 % 113 (H)   Sodium Latest Ref Range: 136 - 145 mmol/L 140   Potassium Latest Ref Range: 3.5 - 5.1 mmol/L 3.3 (L)   Chloride Latest Ref Range: 95 - 110 mmol/L 103   CO2 Latest Ref Range: 22 - 31 mmol/L 26   Anion Gap Latest Ref Range: 8 - 16 mmol/L 11   BUN, Bld Latest Ref Range: 7 - 18 mg/dL 36 (H)   Creatinine Latest Ref Range: 0.50 - 1.40 mg/dL 3.31  (H)   eGFR if non African American Latest Ref Range: >60 mL/min/1.73 m^2 12 (A)   eGFR if African American Latest Ref Range: >60 mL/min/1.73 m^2 14 (A)   Glucose Latest Ref Range: 70 - 110 mg/dL 142 (H)   Calcium Latest Ref Range: 8.4 - 10.2 mg/dL 8.8   Phosphorus Latest Ref Range: 2.7 - 4.5 mg/dL 4.9 (H)   Magnesium Latest Ref Range: 1.6 - 2.6 mg/dL 2.2   Alkaline Phosphatase Latest Ref Range: 38 - 145 U/L 68   PROTEIN TOTAL Latest Ref Range: 6.0 - 8.4 g/dL 6.9   Albumin Latest Ref Range: 3.5 - 5.2 g/dL 3.5   Uric Acid Latest Ref Range: 2.4 - 5.7 mg/dL 5.3   BILIRUBIN TOTAL Latest Ref Range: 0.2 - 1.3 mg/dL 0.4   AST Latest Ref Range: 14 - 36 U/L 17   ALT Latest Ref Range: 10 - 44 U/L 11   PTH Latest Ref Range: 9.0 - 77.0 pg/mL 197.0 (H)   Specimen UA Unknown Urine, Unspecified   Color, UA Latest Ref Range: Yellow, Straw, Emily  Yellow   Appearance, UA Latest Ref Range: Clear  Turbid   Specific Gravity, UA Latest Ref Range: 1.005 - 1.030  1.010   pH, UA Latest Ref Range: 5.0 - 8.0  8.0   Protein, UA Latest Ref Range: Negative  2+ (A)   Glucose, UA Latest Ref Range: Negative  Negative   Ketones, UA Latest Ref Range: Negative  Negative   Occult Blood UA Latest Ref Range: Negative  2+ (A)   NITRITE UA Latest Ref Range: Negative  Negative   UROBILINOGEN UA Latest Ref Range: <2.0 EU/dL 0.2   Bilirubin (UA) Latest Ref Range: Negative  Negative   Leukocytes, UA Latest Ref Range: Negative  3+ (A)   RBC, UA Latest Ref Range: 0 - 4 /hpf SEE COMMENT   WBC, UA Latest Ref Range: 0 - 5 /hpf >100 (H)   Bacteria, UA Latest Ref Range: None-Occ /hpf Moderate (A)   Squam Epithel, UA Latest Units: /hpf SEE COMMENT   Hyaline Casts, UA Latest Ref Range: 0 - 1 /lpf 0   Microscopic Comment Unknown    Prot/Creat Ratio, Ur Latest Ref Range: 10.0 - 107.0 mg/g 3442.0 (H)   Protein, Urine Random Latest Ref Range: 0 - 11 mg/dL 95 (H)   Creatinine, Random Ur Latest Ref Range: 15.0 - 325.0 mg/dL 27.6     Results for MARIANN CAMPBELL (MRN [id number])  as of 8/29/2019 15:44   Ref. Range 5/11/2019 08:29 7/6/2019 08:41   Hemoglobin A1C External Latest Ref Range: 0.0 - 5.6 % 5.9 (H) 5.5   Estimated Avg Glucose Latest Ref Range: 68 - 131 mg/dL 123 111     Assessment:        1. Chronic arterial ischemic stroke, multifocal, multiple vascular territories    2. Severe atherosclerotic cerebrovascular disease    3. Stage 4 chronic kidney disease    4. Essential hypertension    5. Dyslipidemia, goal LDL below 70    6. Aphasia    7. Left carotid artery stenosis           Plan:         Problem List Items Addressed This Visit        Neuro    Aphasia    Chronic arterial ischemic stroke, multifocal, multiple vascular territories - Primary    Current Assessment & Plan     The microscopic, possibly subacute lesions seen on the MRI in May would not in and of itself be expected to cause any significant clinical symptoms.  More likely this was incidental, and high potential to find similar nonspecific lesions at any given time as she has severe chronic diffuse small-vessel cerebrovascular disease, with considerable encephalomalacia and cerebral atrophy.    Clinically, she looks amazingly good despite the severity of radiographic disease.  Counseled on continued risk factor reduction, recognition of stroke s/sx and acute treatment strategies               Relevant Orders    US Carotid Bilateral    Severe atherosclerotic cerebrovascular disease    Overview     Diffuse chronic small vessel disease           Current Assessment & Plan     Medical management         Relevant Orders    US Carotid Bilateral       Cardiac/Vascular    Essential hypertension    Dyslipidemia, goal LDL below 70    Left carotid artery stenosis    Current Assessment & Plan     Disparity between MRA (some movement artifact) and carotid U/S.  CTA or MRA with contrast not an option due to renal disease. Would be higher risk surgical candidate.     con't medical management, follow up carotid U/S in November              Relevant Orders    US Carotid Bilateral       Renal/    Stage 4 chronic kidney disease          Follow up in about 6 months (around 2/29/2020).       Thank you very much for the opportunity to assist in this patient's care.  If you have any questions or concerns, please do not hesitate to contact me at any time.     Sincerely,  Jas Begum, DO

## 2019-08-29 NOTE — ASSESSMENT & PLAN NOTE
The microscopic, possibly subacute lesions seen on the MRI in May would not in and of itself be expected to cause any significant clinical symptoms.  More likely this was incidental, and high potential to find similar nonspecific lesions at any given time as she has severe chronic diffuse small-vessel cerebrovascular disease, with considerable encephalomalacia and cerebral atrophy.    Clinically, she looks amazingly good despite the severity of radiographic disease.  Counseled on continued risk factor reduction, recognition of stroke s/sx and acute treatment strategies

## 2019-08-31 PROBLEM — I65.22 LEFT CAROTID ARTERY STENOSIS: Status: ACTIVE | Noted: 2019-08-31

## 2019-08-31 NOTE — ASSESSMENT & PLAN NOTE
Disparity between MRA (some movement artifact) and carotid U/S.  CTA or MRA with contrast not an option due to renal disease. Would be higher risk surgical candidate.     con't medical management, follow up carotid U/S in November

## 2020-01-01 ENCOUNTER — PATIENT MESSAGE (OUTPATIENT)
Dept: OTHER | Facility: OTHER | Age: 80
End: 2020-01-01

## 2020-01-01 DIAGNOSIS — Z86.73 HISTORY OF EMBOLIC STROKE: ICD-10-CM

## 2020-01-01 DIAGNOSIS — I63.333 CEREBRAL INFARCTION DUE TO BILATERAL THROMBOSIS OF POSTERIOR CEREBRAL ARTERIES: ICD-10-CM

## 2020-01-01 RX ORDER — CLOPIDOGREL BISULFATE 75 MG/1
75 TABLET ORAL DAILY
Qty: 90 TABLET | Refills: 3 | Status: SHIPPED | OUTPATIENT
Start: 2020-01-01

## 2020-10-14 NOTE — TELEPHONE ENCOUNTER
----- Message from Jessica Harper sent at 10/14/2020  3:14 PM CDT -----  Contact: Salena Phyllis (Daughter)  Salena Tayloryan (Daughter) calling states needs a refill on the pt clopidogrel (PLAVIX) 75 mg tablet, pt will be out in 2 days so needing it called in ,please and then call her at ..[phone] (home)             .  [facility] [address] [place] [address] [address] [facility] [address]  [address] [facility] [address]  [facility] [address]  Phone: [phone] Fax: [phone]

## 2021-01-01 ENCOUNTER — HOSPITAL ENCOUNTER (INPATIENT)
Facility: HOSPITAL | Age: 81
LOS: 1 days | Discharge: HOSPICE | DRG: 054 | End: 2021-04-26
Attending: EMERGENCY MEDICINE | Admitting: EMERGENCY MEDICINE
Payer: MEDICARE

## 2021-01-01 VITALS
HEART RATE: 73 BPM | WEIGHT: 131.81 LBS | BODY MASS INDEX: 19.98 KG/M2 | SYSTOLIC BLOOD PRESSURE: 114 MMHG | DIASTOLIC BLOOD PRESSURE: 53 MMHG | HEIGHT: 68 IN | RESPIRATION RATE: 18 BRPM | TEMPERATURE: 98 F | OXYGEN SATURATION: 93 %

## 2021-01-01 DIAGNOSIS — R91.8 LUNG MASS: Primary | ICD-10-CM

## 2021-01-01 DIAGNOSIS — D49.6 NEOPLASM OF BRAIN CAUSING MASS EFFECT ON ADJACENT STRUCTURES: ICD-10-CM

## 2021-01-01 DIAGNOSIS — R29.898 RIGHT HAND WEAKNESS: ICD-10-CM

## 2021-01-01 LAB
ALBUMIN SERPL BCP-MCNC: 2.9 G/DL (ref 3.5–5.2)
ALBUMIN SERPL BCP-MCNC: 3 G/DL (ref 3.5–5.2)
ALP SERPL-CCNC: 57 U/L (ref 55–135)
ALP SERPL-CCNC: 64 U/L (ref 55–135)
ALT SERPL W/O P-5'-P-CCNC: 8 U/L (ref 10–44)
ALT SERPL W/O P-5'-P-CCNC: 9 U/L (ref 10–44)
ANION GAP SERPL CALC-SCNC: 10 MMOL/L (ref 8–16)
ANION GAP SERPL CALC-SCNC: 9 MMOL/L (ref 8–16)
AST SERPL-CCNC: 12 U/L (ref 10–40)
AST SERPL-CCNC: 13 U/L (ref 10–40)
BASOPHILS # BLD AUTO: 0.01 K/UL (ref 0–0.2)
BASOPHILS # BLD AUTO: 0.01 K/UL (ref 0–0.2)
BASOPHILS NFR BLD: 0.1 % (ref 0–1.9)
BASOPHILS NFR BLD: 0.1 % (ref 0–1.9)
BILIRUB DIRECT SERPL-MCNC: 0.2 MG/DL (ref 0.1–0.3)
BILIRUB SERPL-MCNC: 0.3 MG/DL (ref 0.1–1)
BILIRUB SERPL-MCNC: 0.4 MG/DL (ref 0.1–1)
BUN SERPL-MCNC: 47 MG/DL (ref 8–23)
BUN SERPL-MCNC: 58 MG/DL (ref 8–23)
CALCIUM SERPL-MCNC: 8.5 MG/DL (ref 8.7–10.5)
CALCIUM SERPL-MCNC: 8.8 MG/DL (ref 8.7–10.5)
CHLORIDE SERPL-SCNC: 111 MMOL/L (ref 95–110)
CHLORIDE SERPL-SCNC: 111 MMOL/L (ref 95–110)
CO2 SERPL-SCNC: 17 MMOL/L (ref 23–29)
CO2 SERPL-SCNC: 18 MMOL/L (ref 23–29)
CREAT SERPL-MCNC: 3.4 MG/DL (ref 0.5–1.4)
CREAT SERPL-MCNC: 3.6 MG/DL (ref 0.5–1.4)
DIFFERENTIAL METHOD: ABNORMAL
DIFFERENTIAL METHOD: ABNORMAL
EOSINOPHIL # BLD AUTO: 0 K/UL (ref 0–0.5)
EOSINOPHIL # BLD AUTO: 0 K/UL (ref 0–0.5)
EOSINOPHIL NFR BLD: 0 % (ref 0–8)
EOSINOPHIL NFR BLD: 0.1 % (ref 0–8)
ERYTHROCYTE [DISTWIDTH] IN BLOOD BY AUTOMATED COUNT: 12.5 % (ref 11.5–14.5)
ERYTHROCYTE [DISTWIDTH] IN BLOOD BY AUTOMATED COUNT: 12.7 % (ref 11.5–14.5)
EST. GFR  (AFRICAN AMERICAN): 12.8 ML/MIN/1.73 M^2
EST. GFR  (AFRICAN AMERICAN): 13.7 ML/MIN/1.73 M^2
EST. GFR  (NON AFRICAN AMERICAN): 11.1 ML/MIN/1.73 M^2
EST. GFR  (NON AFRICAN AMERICAN): 11.9 ML/MIN/1.73 M^2
FERRITIN SERPL-MCNC: 921 NG/ML (ref 20–300)
GLUCOSE SERPL-MCNC: 119 MG/DL (ref 70–110)
GLUCOSE SERPL-MCNC: 134 MG/DL (ref 70–110)
HCT VFR BLD AUTO: 31 % (ref 37–48.5)
HCT VFR BLD AUTO: 31.2 % (ref 37–48.5)
HGB BLD-MCNC: 10.1 G/DL (ref 12–16)
HGB BLD-MCNC: 10.2 G/DL (ref 12–16)
IMM GRANULOCYTES # BLD AUTO: 0.03 K/UL (ref 0–0.04)
IMM GRANULOCYTES # BLD AUTO: 0.07 K/UL (ref 0–0.04)
IMM GRANULOCYTES NFR BLD AUTO: 0.4 % (ref 0–0.5)
IMM GRANULOCYTES NFR BLD AUTO: 0.6 % (ref 0–0.5)
LYMPHOCYTES # BLD AUTO: 0.9 K/UL (ref 1–4.8)
LYMPHOCYTES # BLD AUTO: 1.4 K/UL (ref 1–4.8)
LYMPHOCYTES NFR BLD: 12.1 % (ref 18–48)
LYMPHOCYTES NFR BLD: 12.2 % (ref 18–48)
MAGNESIUM SERPL-MCNC: 1.8 MG/DL (ref 1.6–2.6)
MAGNESIUM SERPL-MCNC: 2.1 MG/DL (ref 1.6–2.6)
MCH RBC QN AUTO: 32.2 PG (ref 27–31)
MCH RBC QN AUTO: 32.8 PG (ref 27–31)
MCHC RBC AUTO-ENTMCNC: 32.6 G/DL (ref 32–36)
MCHC RBC AUTO-ENTMCNC: 32.7 G/DL (ref 32–36)
MCV RBC AUTO: 101 FL (ref 82–98)
MCV RBC AUTO: 98 FL (ref 82–98)
MONOCYTES # BLD AUTO: 0.1 K/UL (ref 0.3–1)
MONOCYTES # BLD AUTO: 0.3 K/UL (ref 0.3–1)
MONOCYTES NFR BLD: 1 % (ref 4–15)
MONOCYTES NFR BLD: 2.2 % (ref 4–15)
NEUTROPHILS # BLD AUTO: 6.7 K/UL (ref 1.8–7.7)
NEUTROPHILS # BLD AUTO: 9.5 K/UL (ref 1.8–7.7)
NEUTROPHILS NFR BLD: 84.9 % (ref 38–73)
NEUTROPHILS NFR BLD: 86.3 % (ref 38–73)
NRBC BLD-RTO: 0 /100 WBC
NRBC BLD-RTO: 0 /100 WBC
PHOSPHATE SERPL-MCNC: 3.9 MG/DL (ref 2.7–4.5)
PHOSPHATE SERPL-MCNC: 4.8 MG/DL (ref 2.7–4.5)
PLATELET # BLD AUTO: 215 K/UL (ref 150–450)
PLATELET # BLD AUTO: 223 K/UL (ref 150–450)
PMV BLD AUTO: 10 FL (ref 9.2–12.9)
PMV BLD AUTO: 10.3 FL (ref 9.2–12.9)
POTASSIUM SERPL-SCNC: 5 MMOL/L (ref 3.5–5.1)
POTASSIUM SERPL-SCNC: 5.1 MMOL/L (ref 3.5–5.1)
PROT SERPL-MCNC: 6.7 G/DL (ref 6–8.4)
PROT SERPL-MCNC: 6.7 G/DL (ref 6–8.4)
RBC # BLD AUTO: 3.08 M/UL (ref 4–5.4)
RBC # BLD AUTO: 3.17 M/UL (ref 4–5.4)
RETICS/RBC NFR AUTO: 1.4 % (ref 0.5–2.5)
SODIUM SERPL-SCNC: 138 MMOL/L (ref 136–145)
SODIUM SERPL-SCNC: 138 MMOL/L (ref 136–145)
WBC # BLD AUTO: 11.24 K/UL (ref 3.9–12.7)
WBC # BLD AUTO: 7.75 K/UL (ref 3.9–12.7)

## 2021-01-01 PROCEDURE — 99497 PR ADVNCD CARE PLAN 30 MIN: ICD-10-PCS | Mod: ,,, | Performed by: STUDENT IN AN ORGANIZED HEALTH CARE EDUCATION/TRAINING PROGRAM

## 2021-01-01 PROCEDURE — 97116 GAIT TRAINING THERAPY: CPT

## 2021-01-01 PROCEDURE — 84100 ASSAY OF PHOSPHORUS: CPT | Performed by: STUDENT IN AN ORGANIZED HEALTH CARE EDUCATION/TRAINING PROGRAM

## 2021-01-01 PROCEDURE — 36415 COLL VENOUS BLD VENIPUNCTURE: CPT | Performed by: STUDENT IN AN ORGANIZED HEALTH CARE EDUCATION/TRAINING PROGRAM

## 2021-01-01 PROCEDURE — 96374 THER/PROPH/DIAG INJ IV PUSH: CPT

## 2021-01-01 PROCEDURE — 97161 PT EVAL LOW COMPLEX 20 MIN: CPT

## 2021-01-01 PROCEDURE — 83735 ASSAY OF MAGNESIUM: CPT | Performed by: STUDENT IN AN ORGANIZED HEALTH CARE EDUCATION/TRAINING PROGRAM

## 2021-01-01 PROCEDURE — 25000003 PHARM REV CODE 250: Performed by: STUDENT IN AN ORGANIZED HEALTH CARE EDUCATION/TRAINING PROGRAM

## 2021-01-01 PROCEDURE — 63600175 PHARM REV CODE 636 W HCPCS: Performed by: STUDENT IN AN ORGANIZED HEALTH CARE EDUCATION/TRAINING PROGRAM

## 2021-01-01 PROCEDURE — 11000001 HC ACUTE MED/SURG PRIVATE ROOM

## 2021-01-01 PROCEDURE — 99223 1ST HOSP IP/OBS HIGH 75: CPT | Mod: ,,, | Performed by: NEUROLOGICAL SURGERY

## 2021-01-01 PROCEDURE — 99285 PR EMERGENCY DEPT VISIT,LEVEL V: ICD-10-PCS | Mod: ,,, | Performed by: EMERGENCY MEDICINE

## 2021-01-01 PROCEDURE — 85025 COMPLETE CBC W/AUTO DIFF WBC: CPT | Performed by: STUDENT IN AN ORGANIZED HEALTH CARE EDUCATION/TRAINING PROGRAM

## 2021-01-01 PROCEDURE — 97530 THERAPEUTIC ACTIVITIES: CPT

## 2021-01-01 PROCEDURE — 99239 PR HOSPITAL DISCHARGE DAY,>30 MIN: ICD-10-PCS | Mod: GC,,, | Performed by: STUDENT IN AN ORGANIZED HEALTH CARE EDUCATION/TRAINING PROGRAM

## 2021-01-01 PROCEDURE — 99223 PR INITIAL HOSPITAL CARE,LEVL III: ICD-10-PCS | Mod: ,,, | Performed by: NEUROLOGICAL SURGERY

## 2021-01-01 PROCEDURE — 82248 BILIRUBIN DIRECT: CPT | Performed by: STUDENT IN AN ORGANIZED HEALTH CARE EDUCATION/TRAINING PROGRAM

## 2021-01-01 PROCEDURE — 85045 AUTOMATED RETICULOCYTE COUNT: CPT | Performed by: STUDENT IN AN ORGANIZED HEALTH CARE EDUCATION/TRAINING PROGRAM

## 2021-01-01 PROCEDURE — 99497 ADVNCD CARE PLAN 30 MIN: CPT | Mod: ,,, | Performed by: STUDENT IN AN ORGANIZED HEALTH CARE EDUCATION/TRAINING PROGRAM

## 2021-01-01 PROCEDURE — 82728 ASSAY OF FERRITIN: CPT | Performed by: STUDENT IN AN ORGANIZED HEALTH CARE EDUCATION/TRAINING PROGRAM

## 2021-01-01 PROCEDURE — 80053 COMPREHEN METABOLIC PANEL: CPT | Performed by: STUDENT IN AN ORGANIZED HEALTH CARE EDUCATION/TRAINING PROGRAM

## 2021-01-01 PROCEDURE — 25000003 PHARM REV CODE 250: Performed by: EMERGENCY MEDICINE

## 2021-01-01 PROCEDURE — 99223 1ST HOSP IP/OBS HIGH 75: CPT | Mod: AI,GC,, | Performed by: STUDENT IN AN ORGANIZED HEALTH CARE EDUCATION/TRAINING PROGRAM

## 2021-01-01 PROCEDURE — 99239 HOSP IP/OBS DSCHRG MGMT >30: CPT | Mod: GC,,, | Performed by: STUDENT IN AN ORGANIZED HEALTH CARE EDUCATION/TRAINING PROGRAM

## 2021-01-01 PROCEDURE — 99285 EMERGENCY DEPT VISIT HI MDM: CPT | Mod: ,,, | Performed by: EMERGENCY MEDICINE

## 2021-01-01 PROCEDURE — 63600175 PHARM REV CODE 636 W HCPCS: Performed by: EMERGENCY MEDICINE

## 2021-01-01 PROCEDURE — 99285 EMERGENCY DEPT VISIT HI MDM: CPT | Mod: 25

## 2021-01-01 PROCEDURE — 99223 PR INITIAL HOSPITAL CARE,LEVL III: ICD-10-PCS | Mod: AI,GC,, | Performed by: STUDENT IN AN ORGANIZED HEALTH CARE EDUCATION/TRAINING PROGRAM

## 2021-01-01 RX ORDER — FERROUS SULFATE 325(65) MG
325 TABLET, DELAYED RELEASE (ENTERIC COATED) ORAL DAILY
Status: CANCELLED | OUTPATIENT
Start: 2021-01-01

## 2021-01-01 RX ORDER — CALCITRIOL 0.25 UG/1
0.25 CAPSULE ORAL DAILY
Status: CANCELLED | OUTPATIENT
Start: 2021-01-01

## 2021-01-01 RX ORDER — LEVETIRACETAM 5 MG/ML
500 INJECTION INTRAVASCULAR EVERY 12 HOURS
Status: DISCONTINUED | OUTPATIENT
Start: 2021-01-01 | End: 2021-01-01 | Stop reason: HOSPADM

## 2021-01-01 RX ORDER — CHOLECALCIFEROL (VITAMIN D3) 50 MCG
1 TABLET ORAL DAILY
Status: CANCELLED | OUTPATIENT
Start: 2021-01-01

## 2021-01-01 RX ORDER — METOPROLOL SUCCINATE 25 MG/1
25 TABLET, EXTENDED RELEASE ORAL DAILY
Status: CANCELLED | OUTPATIENT
Start: 2021-01-01

## 2021-01-01 RX ORDER — PANTOPRAZOLE SODIUM 40 MG/1
40 TABLET, DELAYED RELEASE ORAL DAILY
Status: DISCONTINUED | OUTPATIENT
Start: 2021-01-01 | End: 2021-01-01 | Stop reason: HOSPADM

## 2021-01-01 RX ORDER — IBUPROFEN 200 MG
24 TABLET ORAL
Status: DISCONTINUED | OUTPATIENT
Start: 2021-01-01 | End: 2021-01-01 | Stop reason: HOSPADM

## 2021-01-01 RX ORDER — LEVETIRACETAM 500 MG/1
500 TABLET ORAL 2 TIMES DAILY
Qty: 60 TABLET | Refills: 11 | Status: SHIPPED | OUTPATIENT
Start: 2021-01-01 | End: 2022-04-25

## 2021-01-01 RX ORDER — CALCITRIOL 0.25 UG/1
0.25 CAPSULE ORAL DAILY
Status: DISCONTINUED | OUTPATIENT
Start: 2021-01-01 | End: 2021-01-01 | Stop reason: HOSPADM

## 2021-01-01 RX ORDER — SODIUM CHLORIDE 0.9 % (FLUSH) 0.9 %
10 SYRINGE (ML) INJECTION
Status: DISCONTINUED | OUTPATIENT
Start: 2021-01-01 | End: 2021-01-01 | Stop reason: HOSPADM

## 2021-01-01 RX ORDER — ATORVASTATIN CALCIUM 10 MG/1
10 TABLET, FILM COATED ORAL DAILY
Status: DISCONTINUED | OUTPATIENT
Start: 2021-01-01 | End: 2021-01-01 | Stop reason: HOSPADM

## 2021-01-01 RX ORDER — METOPROLOL SUCCINATE 25 MG/1
25 TABLET, EXTENDED RELEASE ORAL DAILY
Status: DISCONTINUED | OUTPATIENT
Start: 2021-01-01 | End: 2021-01-01 | Stop reason: HOSPADM

## 2021-01-01 RX ORDER — DEXAMETHASONE 4 MG/1
4 TABLET ORAL EVERY 6 HOURS
Qty: 40 TABLET | Refills: 0 | Status: SHIPPED | OUTPATIENT
Start: 2021-01-01 | End: 2021-01-01

## 2021-01-01 RX ORDER — CLOPIDOGREL BISULFATE 75 MG/1
75 TABLET ORAL DAILY
Status: CANCELLED | OUTPATIENT
Start: 2021-01-01

## 2021-01-01 RX ORDER — NAPROXEN SODIUM 220 MG/1
81 TABLET, FILM COATED ORAL DAILY
Status: DISCONTINUED | OUTPATIENT
Start: 2021-01-01 | End: 2021-01-01 | Stop reason: HOSPADM

## 2021-01-01 RX ORDER — HEPARIN SODIUM 5000 [USP'U]/ML
5000 INJECTION, SOLUTION INTRAVENOUS; SUBCUTANEOUS EVERY 8 HOURS
Status: DISCONTINUED | OUTPATIENT
Start: 2021-01-01 | End: 2021-01-01 | Stop reason: HOSPADM

## 2021-01-01 RX ORDER — DEXAMETHASONE SODIUM PHOSPHATE 4 MG/ML
4 INJECTION, SOLUTION INTRA-ARTICULAR; INTRALESIONAL; INTRAMUSCULAR; INTRAVENOUS; SOFT TISSUE EVERY 6 HOURS
Status: DISCONTINUED | OUTPATIENT
Start: 2021-01-01 | End: 2021-01-01 | Stop reason: HOSPADM

## 2021-01-01 RX ORDER — FERROUS SULFATE 325(65) MG
325 TABLET, DELAYED RELEASE (ENTERIC COATED) ORAL DAILY
Status: DISCONTINUED | OUTPATIENT
Start: 2021-01-01 | End: 2021-01-01 | Stop reason: HOSPADM

## 2021-01-01 RX ORDER — SODIUM CHLORIDE 9 MG/ML
INJECTION, SOLUTION INTRAVENOUS
Status: COMPLETED | OUTPATIENT
Start: 2021-01-01 | End: 2021-01-01

## 2021-01-01 RX ORDER — SODIUM BICARBONATE 325 MG/1
650 TABLET ORAL 3 TIMES DAILY
Status: DISCONTINUED | OUTPATIENT
Start: 2021-01-01 | End: 2021-01-01

## 2021-01-01 RX ORDER — ATORVASTATIN CALCIUM 10 MG/1
10 TABLET, FILM COATED ORAL DAILY
Status: CANCELLED | OUTPATIENT
Start: 2021-01-01

## 2021-01-01 RX ORDER — SODIUM BICARBONATE 650 MG/1
650 TABLET ORAL 3 TIMES DAILY
Status: CANCELLED | OUTPATIENT
Start: 2021-01-01

## 2021-01-01 RX ORDER — CETIRIZINE HYDROCHLORIDE 5 MG/1
10 TABLET ORAL DAILY
Status: CANCELLED | OUTPATIENT
Start: 2021-01-01

## 2021-01-01 RX ORDER — SERTRALINE HYDROCHLORIDE 50 MG/1
50 TABLET, FILM COATED ORAL DAILY
Status: DISCONTINUED | OUTPATIENT
Start: 2021-01-01 | End: 2021-01-01 | Stop reason: HOSPADM

## 2021-01-01 RX ORDER — SERTRALINE HYDROCHLORIDE 50 MG/1
50 TABLET, FILM COATED ORAL DAILY
Status: CANCELLED | OUTPATIENT
Start: 2021-01-01

## 2021-01-01 RX ORDER — ONDANSETRON 4 MG/1
8 TABLET, ORALLY DISINTEGRATING ORAL EVERY 8 HOURS PRN
Status: DISCONTINUED | OUTPATIENT
Start: 2021-01-01 | End: 2021-01-01 | Stop reason: HOSPADM

## 2021-01-01 RX ORDER — GLUCAGON 1 MG
1 KIT INJECTION
Status: DISCONTINUED | OUTPATIENT
Start: 2021-01-01 | End: 2021-01-01 | Stop reason: HOSPADM

## 2021-01-01 RX ORDER — LEVETIRACETAM 5 MG/ML
500 INJECTION INTRAVASCULAR
Status: COMPLETED | OUTPATIENT
Start: 2021-01-01 | End: 2021-01-01

## 2021-01-01 RX ORDER — LEVETIRACETAM 500 MG/1
500 TABLET ORAL 2 TIMES DAILY
Status: DISCONTINUED | OUTPATIENT
Start: 2021-01-01 | End: 2021-01-01

## 2021-01-01 RX ORDER — IBUPROFEN 200 MG
16 TABLET ORAL
Status: DISCONTINUED | OUTPATIENT
Start: 2021-01-01 | End: 2021-01-01 | Stop reason: HOSPADM

## 2021-01-01 RX ORDER — PANTOPRAZOLE SODIUM 40 MG/1
40 TABLET, DELAYED RELEASE ORAL DAILY
Qty: 30 TABLET | Refills: 11 | Status: SHIPPED | OUTPATIENT
Start: 2021-01-01 | End: 2022-04-26

## 2021-01-01 RX ADMIN — ASPIRIN 81 MG CHEWABLE TABLET 81 MG: 81 TABLET CHEWABLE at 11:04

## 2021-01-01 RX ADMIN — HEPARIN SODIUM 5000 UNITS: 5000 INJECTION INTRAVENOUS; SUBCUTANEOUS at 09:04

## 2021-01-01 RX ADMIN — CALCITRIOL CAPSULES 0.25 MCG 0.25 MCG: 0.25 CAPSULE ORAL at 09:04

## 2021-01-01 RX ADMIN — SERTRALINE HYDROCHLORIDE 50 MG: 50 TABLET ORAL at 10:04

## 2021-01-01 RX ADMIN — ASPIRIN 81 MG CHEWABLE TABLET 81 MG: 81 TABLET CHEWABLE at 09:04

## 2021-01-01 RX ADMIN — DEXAMETHASONE SODIUM PHOSPHATE 4 MG: 4 INJECTION INTRA-ARTICULAR; INTRALESIONAL; INTRAMUSCULAR; INTRAVENOUS; SOFT TISSUE at 06:04

## 2021-01-01 RX ADMIN — LEVETIRACETAM 500 MG: 5 INJECTION INTRAVENOUS at 11:04

## 2021-01-01 RX ADMIN — LEVETIRACETAM 500 MG: 5 INJECTION INTRAVENOUS at 09:04

## 2021-01-01 RX ADMIN — PANTOPRAZOLE SODIUM 40 MG: 40 TABLET, DELAYED RELEASE ORAL at 09:04

## 2021-01-01 RX ADMIN — ATORVASTATIN CALCIUM 10 MG: 10 TABLET, FILM COATED ORAL at 09:04

## 2021-01-01 RX ADMIN — HEPARIN SODIUM 5000 UNITS: 5000 INJECTION INTRAVENOUS; SUBCUTANEOUS at 02:04

## 2021-01-01 RX ADMIN — HEPARIN SODIUM 5000 UNITS: 5000 INJECTION INTRAVENOUS; SUBCUTANEOUS at 06:04

## 2021-01-01 RX ADMIN — CALCITRIOL CAPSULES 0.25 MCG 0.25 MCG: 0.25 CAPSULE ORAL at 10:04

## 2021-01-01 RX ADMIN — DEXAMETHASONE SODIUM PHOSPHATE 4 MG: 4 INJECTION INTRA-ARTICULAR; INTRALESIONAL; INTRAMUSCULAR; INTRAVENOUS; SOFT TISSUE at 12:04

## 2021-01-01 RX ADMIN — DEXAMETHASONE SODIUM PHOSPHATE 4 MG: 4 INJECTION INTRA-ARTICULAR; INTRALESIONAL; INTRAMUSCULAR; INTRAVENOUS; SOFT TISSUE at 11:04

## 2021-01-01 RX ADMIN — LEVETIRACETAM 500 MG: 5 INJECTION INTRAVENOUS at 05:04

## 2021-01-01 RX ADMIN — SERTRALINE HYDROCHLORIDE 50 MG: 50 TABLET ORAL at 09:04

## 2021-01-01 RX ADMIN — SODIUM CHLORIDE: 0.9 INJECTION, SOLUTION INTRAVENOUS at 05:04

## 2021-01-01 RX ADMIN — ATORVASTATIN CALCIUM 10 MG: 10 TABLET, FILM COATED ORAL at 10:04

## 2021-01-01 RX ADMIN — METOPROLOL SUCCINATE 25 MG: 25 TABLET, EXTENDED RELEASE ORAL at 10:04

## 2021-01-01 RX ADMIN — FERROUS SULFATE TAB EC 325 MG (65 MG FE EQUIVALENT) 325 MG: 325 (65 FE) TABLET DELAYED RESPONSE at 10:04

## 2021-01-01 RX ADMIN — METOPROLOL SUCCINATE 25 MG: 25 TABLET, EXTENDED RELEASE ORAL at 09:04

## 2021-01-01 RX ADMIN — FERROUS SULFATE TAB EC 325 MG (65 MG FE EQUIVALENT) 325 MG: 325 (65 FE) TABLET DELAYED RESPONSE at 09:04

## 2021-04-25 PROBLEM — D49.6 NEOPLASM OF BRAIN CAUSING MASS EFFECT ON ADJACENT STRUCTURES: Status: ACTIVE | Noted: 2021-01-01

## 2021-04-25 PROBLEM — I67.2 ATHEROSCLEROTIC CEREBROVASCULAR DISEASE: Chronic | Status: ACTIVE | Noted: 2019-08-29

## 2021-04-25 PROBLEM — G93.89 BRAIN MASS: Status: ACTIVE | Noted: 2021-01-01

## 2021-04-25 PROBLEM — N18.9 ACUTE KIDNEY INJURY SUPERIMPOSED ON CKD: Status: RESOLVED | Noted: 2018-11-13 | Resolved: 2021-01-01

## 2021-04-25 PROBLEM — N17.9 ACUTE KIDNEY INJURY SUPERIMPOSED ON CKD: Status: RESOLVED | Noted: 2018-11-13 | Resolved: 2021-01-01

## 2021-04-25 PROBLEM — N18.4 STAGE 4 CHRONIC KIDNEY DISEASE: Chronic | Status: ACTIVE | Noted: 2018-03-16

## 2021-04-25 PROBLEM — N30.00 ACUTE CYSTITIS: Status: RESOLVED | Noted: 2018-11-13 | Resolved: 2021-01-01
